# Patient Record
Sex: FEMALE | Race: OTHER | URBAN - METROPOLITAN AREA
[De-identification: names, ages, dates, MRNs, and addresses within clinical notes are randomized per-mention and may not be internally consistent; named-entity substitution may affect disease eponyms.]

---

## 2019-06-01 ENCOUNTER — INPATIENT (INPATIENT)
Facility: HOSPITAL | Age: 54
LOS: 2 days | Discharge: PSYCHIATRIC OR SUBSTANCE USE FACILITY | End: 2019-06-04
Attending: INTERNAL MEDICINE | Admitting: INTERNAL MEDICINE
Payer: COMMERCIAL

## 2019-06-01 VITALS
OXYGEN SATURATION: 99 % | TEMPERATURE: 98 F | SYSTOLIC BLOOD PRESSURE: 131 MMHG | HEART RATE: 82 BPM | RESPIRATION RATE: 18 BRPM | DIASTOLIC BLOOD PRESSURE: 84 MMHG | WEIGHT: 173.94 LBS | HEIGHT: 67 IN

## 2019-06-01 DIAGNOSIS — F32.9 MAJOR DEPRESSIVE DISORDER, SINGLE EPISODE, UNSPECIFIED: ICD-10-CM

## 2019-06-01 DIAGNOSIS — T42.4X2A POISONING BY BENZODIAZEPINES, INTENTIONAL SELF-HARM, INITIAL ENCOUNTER: ICD-10-CM

## 2019-06-01 LAB
ALBUMIN SERPL ELPH-MCNC: 3.7 G/DL — SIGNIFICANT CHANGE UP (ref 3.3–5)
ALP SERPL-CCNC: 89 U/L — SIGNIFICANT CHANGE UP (ref 40–120)
ALT FLD-CCNC: 20 U/L — SIGNIFICANT CHANGE UP (ref 12–78)
ANION GAP SERPL CALC-SCNC: 6 MMOL/L — SIGNIFICANT CHANGE UP (ref 5–17)
APAP SERPL-MCNC: < 2 UG/ML (ref 10–30)
AST SERPL-CCNC: 16 U/L — SIGNIFICANT CHANGE UP (ref 15–37)
BASOPHILS # BLD AUTO: 0.02 K/UL — SIGNIFICANT CHANGE UP (ref 0–0.2)
BASOPHILS NFR BLD AUTO: 0.3 % — SIGNIFICANT CHANGE UP (ref 0–2)
BILIRUB SERPL-MCNC: 0.4 MG/DL — SIGNIFICANT CHANGE UP (ref 0.2–1.2)
BUN SERPL-MCNC: 13 MG/DL — SIGNIFICANT CHANGE UP (ref 7–23)
CALCIUM SERPL-MCNC: 8.9 MG/DL — SIGNIFICANT CHANGE UP (ref 8.5–10.1)
CHLORIDE SERPL-SCNC: 110 MMOL/L — HIGH (ref 96–108)
CO2 SERPL-SCNC: 28 MMOL/L — SIGNIFICANT CHANGE UP (ref 22–31)
CREAT SERPL-MCNC: 0.83 MG/DL — SIGNIFICANT CHANGE UP (ref 0.5–1.3)
EOSINOPHIL # BLD AUTO: 0.03 K/UL — SIGNIFICANT CHANGE UP (ref 0–0.5)
EOSINOPHIL NFR BLD AUTO: 0.4 % — SIGNIFICANT CHANGE UP (ref 0–6)
ETHANOL SERPL-MCNC: <10 MG/DL — SIGNIFICANT CHANGE UP (ref 0–10)
GLUCOSE SERPL-MCNC: 88 MG/DL — SIGNIFICANT CHANGE UP (ref 70–99)
HCT VFR BLD CALC: 38 % — SIGNIFICANT CHANGE UP (ref 34.5–45)
HGB BLD-MCNC: 12.8 G/DL — SIGNIFICANT CHANGE UP (ref 11.5–15.5)
IMM GRANULOCYTES NFR BLD AUTO: 0.1 % — SIGNIFICANT CHANGE UP (ref 0–1.5)
LYMPHOCYTES # BLD AUTO: 2.61 K/UL — SIGNIFICANT CHANGE UP (ref 1–3.3)
LYMPHOCYTES # BLD AUTO: 35.2 % — SIGNIFICANT CHANGE UP (ref 13–44)
MCHC RBC-ENTMCNC: 29.4 PG — SIGNIFICANT CHANGE UP (ref 27–34)
MCHC RBC-ENTMCNC: 33.7 GM/DL — SIGNIFICANT CHANGE UP (ref 32–36)
MCV RBC AUTO: 87.4 FL — SIGNIFICANT CHANGE UP (ref 80–100)
MONOCYTES # BLD AUTO: 0.46 K/UL — SIGNIFICANT CHANGE UP (ref 0–0.9)
MONOCYTES NFR BLD AUTO: 6.2 % — SIGNIFICANT CHANGE UP (ref 2–14)
NEUTROPHILS # BLD AUTO: 4.29 K/UL — SIGNIFICANT CHANGE UP (ref 1.8–7.4)
NEUTROPHILS NFR BLD AUTO: 57.8 % — SIGNIFICANT CHANGE UP (ref 43–77)
NRBC # BLD: 0 /100 WBCS — SIGNIFICANT CHANGE UP (ref 0–0)
PLATELET # BLD AUTO: 222 K/UL — SIGNIFICANT CHANGE UP (ref 150–400)
POTASSIUM SERPL-MCNC: 3.8 MMOL/L — SIGNIFICANT CHANGE UP (ref 3.5–5.3)
POTASSIUM SERPL-SCNC: 3.8 MMOL/L — SIGNIFICANT CHANGE UP (ref 3.5–5.3)
PROT SERPL-MCNC: 8.1 GM/DL — SIGNIFICANT CHANGE UP (ref 6–8.3)
RBC # BLD: 4.35 M/UL — SIGNIFICANT CHANGE UP (ref 3.8–5.2)
RBC # FLD: 12.5 % — SIGNIFICANT CHANGE UP (ref 10.3–14.5)
SALICYLATES SERPL-MCNC: <1.7 MG/DL — LOW (ref 2.8–20)
SODIUM SERPL-SCNC: 144 MMOL/L — SIGNIFICANT CHANGE UP (ref 135–145)
TSH SERPL-MCNC: 2.73 UU/ML — SIGNIFICANT CHANGE UP (ref 0.36–3.74)
WBC # BLD: 7.42 K/UL — SIGNIFICANT CHANGE UP (ref 3.8–10.5)
WBC # FLD AUTO: 7.42 K/UL — SIGNIFICANT CHANGE UP (ref 3.8–10.5)

## 2019-06-01 PROCEDURE — 71045 X-RAY EXAM CHEST 1 VIEW: CPT | Mod: 26

## 2019-06-01 PROCEDURE — 93010 ELECTROCARDIOGRAM REPORT: CPT

## 2019-06-01 PROCEDURE — 99291 CRITICAL CARE FIRST HOUR: CPT

## 2019-06-01 RX ORDER — SODIUM CHLORIDE 9 MG/ML
1000 INJECTION, SOLUTION INTRAVENOUS
Refills: 0 | Status: DISCONTINUED | OUTPATIENT
Start: 2019-06-01 | End: 2019-06-04

## 2019-06-01 RX ORDER — PANTOPRAZOLE SODIUM 20 MG/1
40 TABLET, DELAYED RELEASE ORAL DAILY
Refills: 0 | Status: DISCONTINUED | OUTPATIENT
Start: 2019-06-01 | End: 2019-06-03

## 2019-06-01 RX ORDER — ENOXAPARIN SODIUM 100 MG/ML
40 INJECTION SUBCUTANEOUS DAILY
Refills: 0 | Status: DISCONTINUED | OUTPATIENT
Start: 2019-06-01 | End: 2019-06-04

## 2019-06-01 RX ADMIN — PANTOPRAZOLE SODIUM 40 MILLIGRAM(S): 20 TABLET, DELAYED RELEASE ORAL at 16:13

## 2019-06-01 RX ADMIN — SODIUM CHLORIDE 100 MILLILITER(S): 9 INJECTION, SOLUTION INTRAVENOUS at 16:48

## 2019-06-01 RX ADMIN — ENOXAPARIN SODIUM 40 MILLIGRAM(S): 100 INJECTION SUBCUTANEOUS at 16:13

## 2019-06-01 NOTE — ED ADULT NURSE NOTE - NS ED BHA MSE SPEECH VOLUME
How Severe Is Your Skin Lesion?: mild Has Your Skin Lesion Been Treated?: not been treated Is This A New Presentation, Or A Follow-Up?: Skin Lesion Soft

## 2019-06-01 NOTE — ED ADULT NURSE NOTE - NSIMPLEMENTINTERV_GEN_ALL_ED
Implemented All Fall with Harm Risk Interventions:  [place] to call system. Call bell, personal items and telephone within reach. Instruct patient to call for assistance. Room bathroom lighting operational. Non-slip footwear when patient is off stretcher. Physically safe environment: no spills, clutter or unnecessary equipment. Stretcher in lowest position, wheels locked, appropriate side rails in place. Provide visual cue, wrist band, yellow gown, etc. Monitor gait and stability. Monitor for mental status changes and reorient to person, place, and time. Review medications for side effects contributing to fall risk. Reinforce activity limits and safety measures with patient and family. Provide visual clues: red socks.

## 2019-06-01 NOTE — ED ADULT NURSE NOTE - HPI (INCLUDE ILLNESS QUALITY, SEVERITY, DURATION, TIMING, CONTEXT, MODIFYING FACTORS, ASSOCIATED SIGNS AND SYMPTOMS)
Depression x several years with SI with no plan until today, patient ingested an unknown amount of ativan tablets, states  saddens her and the recent passing of her mother in law exacerbated her depression inclusive of her  proceeding with a vacation to [place] [employer] despite the recent death of his mother. as per friends at the bedside state when she is happy with her  no depressive behaviors are noted however when they are fighting she becomes incredibly sad and hopeless.

## 2019-06-01 NOTE — ED PROVIDER NOTE - CLINICAL SUMMARY MEDICAL DECISION MAKING FREE TEXT BOX
pt with large dose of benzo overdose, otherwise stable but somnolent - will need continuous monitoring until awake enough for medical clearance and psych eval - as one half life is about 12 hours, may need 2 half live's before pt is awake enough for eval.

## 2019-06-01 NOTE — ED ADULT TRIAGE NOTE - CHIEF COMPLAINT QUOTE
pt took 15 tablets of lorazepam 2mg . history of depression. states she has been going through a lot with her .

## 2019-06-01 NOTE — ED PROVIDER NOTE - CRITICAL CARE INDICATION, MLM
patient was critically ill... Patient was critically ill with a high probability of imminent or life threatening deterioration. pt with suicide attempt - monitored closely in ED - until medical clearance, will need psych eval for possible psych admission after medical admission/clearance

## 2019-06-01 NOTE — H&P ADULT - NSHPPHYSICALEXAM_GEN_ALL_CORE
PHYSICAL EXAM:  GENERAL: NAD, well-groomed, well-developed  HEAD:  Atraumatic, Normocephalic  EYES: EOMI, PERRLA, conjunctiva and sclera clear  ENMT: No tonsillar erythema, exudates, or enlargement; Moist mucous membranes, No lesions  NECK: Supple, No JVD, Normal thyroid  NERVOUS SYSTEM:  Lethargic but arousable; Motor Strength 5/5 B/L upper and lower extremities; DTRs 2+ intact and symmetric  CHEST/LUNG: Clear bilaterally; No rales, rhonchi, wheezing, or rubs  HEART: Regular rate and rhythm; No murmurs, rubs, or gallops  ABDOMEN: Soft, Nontender, Nondistended; Bowel sounds present  EXTREMITIES:  2+ Peripheral Pulses, No clubbing, cyanosis, or edema  LYMPH: No lymphadenopathy noted  SKIN: No rashes or lesions

## 2019-06-01 NOTE — H&P ADULT - NSHPLABSRESULTS_GEN_ALL_CORE
LABS:                        12.8   7.42  )-----------( 222      ( 01 Jun 2019 12:08 )             38.0     06-01    144  |  110<H>  |  13  ----------------------------<  88  3.8   |  28  |  0.83    Ca    8.9      01 Jun 2019 12:08    TPro  8.1  /  Alb  3.7  /  TBili  0.4  /  DBili  x   /  AST  16  /  ALT  20  /  AlkPhos  89  06-01        CAPILLARY BLOOD GLUCOSE    Alcohol, Blood (06.01.19 @ 12:08)    Alcohol, Blood: <10: TOXIC CONCENTRATIONS (mg/dL):  Acetaminophen Level, Serum: < 2 ug/mL (06.01.19 @ 12:08)  Salicylate Level, Serum: <1.7 mg/dL (06.01.19 @ 12:08)

## 2019-06-01 NOTE — ED ADULT NURSE NOTE - OBJECTIVE STATEMENT
pt BIBA with c/o suicide attempt as per friend states that patient verbalized she was sad and depressed, as per patient has had marital problems and mother in law just past way, as per patient her  decided to proceed with a vacation despite the mothers passing which upset her very much, she states she has had ongoing marital problems x 4 years but today decided she couldn't take it anymore and wanted to " sleep" she admits to taking several ativan tablets, did not verbalize the exact amount upon arrival to the home the friend found the patient sleepy and decided to have her come to the ED, denies HI admits to previous thoughts of ending her life with no plan until today, patient is placed on 1:1 ligature risks addressed and on continuous cardiac monitoring.

## 2019-06-01 NOTE — ED PROVIDER NOTE - OBJECTIVE STATEMENT
54 year old female with PMH of hypothyroid, depression, otherwise presenting to hospital due to ativan overdose, took 15 tabs of 2mg  ativan at home - otherwise pt states took it as she was upset with  for leaving her to go on biking trip [facility] - states his mother just passed away but states  insisted on leaving though she states she is unable to handle other issues related to mother in law's passing.

## 2019-06-01 NOTE — H&P ADULT - HISTORY OF PRESENT ILLNESS
55yo, femaole with PMH of hypothyroid, depression, otherwise presenting to hospital due to ativan overdose, took 15 tabs of 2mg  ativan at home - otherwise pt  was upset with  for leaving her to go on biking trip [facility] - states his mother just passed away but states  insisted on leaving though she states she is unable to handle other issues related to mother in law's passing.

## 2019-06-01 NOTE — ED PROVIDER NOTE - CONSTITUTIONAL, MLM
normal... Well appearing, well nourished, awake, alert, oriented to person, place, time/situation and appears emotionally upset, currently not somnolent

## 2019-06-02 LAB
ALBUMIN SERPL ELPH-MCNC: 3.7 G/DL — SIGNIFICANT CHANGE UP (ref 3.3–5)
ALP SERPL-CCNC: 88 U/L — SIGNIFICANT CHANGE UP (ref 40–120)
ALT FLD-CCNC: 25 U/L — SIGNIFICANT CHANGE UP (ref 12–78)
AMPHET UR-MCNC: NEGATIVE — SIGNIFICANT CHANGE UP
ANION GAP SERPL CALC-SCNC: 7 MMOL/L — SIGNIFICANT CHANGE UP (ref 5–17)
APPEARANCE UR: CLEAR — SIGNIFICANT CHANGE UP
AST SERPL-CCNC: 15 U/L — SIGNIFICANT CHANGE UP (ref 15–37)
BACTERIA # UR AUTO: NEGATIVE — SIGNIFICANT CHANGE UP
BARBITURATES UR SCN-MCNC: NEGATIVE — SIGNIFICANT CHANGE UP
BASOPHILS # BLD AUTO: 0.03 K/UL — SIGNIFICANT CHANGE UP (ref 0–0.2)
BASOPHILS NFR BLD AUTO: 0.4 % — SIGNIFICANT CHANGE UP (ref 0–2)
BENZODIAZ UR-MCNC: NEGATIVE — SIGNIFICANT CHANGE UP
BILIRUB SERPL-MCNC: 0.4 MG/DL — SIGNIFICANT CHANGE UP (ref 0.2–1.2)
BILIRUB UR-MCNC: NEGATIVE — SIGNIFICANT CHANGE UP
BUN SERPL-MCNC: 12 MG/DL — SIGNIFICANT CHANGE UP (ref 7–23)
CALCIUM SERPL-MCNC: 9.1 MG/DL — SIGNIFICANT CHANGE UP (ref 8.5–10.1)
CHLORIDE SERPL-SCNC: 105 MMOL/L — SIGNIFICANT CHANGE UP (ref 96–108)
CO2 SERPL-SCNC: 29 MMOL/L — SIGNIFICANT CHANGE UP (ref 22–31)
COCAINE METAB.OTHER UR-MCNC: NEGATIVE — SIGNIFICANT CHANGE UP
COLOR SPEC: YELLOW — SIGNIFICANT CHANGE UP
CREAT SERPL-MCNC: 0.84 MG/DL — SIGNIFICANT CHANGE UP (ref 0.5–1.3)
DIFF PNL FLD: ABNORMAL
EOSINOPHIL # BLD AUTO: 0.08 K/UL — SIGNIFICANT CHANGE UP (ref 0–0.5)
EOSINOPHIL NFR BLD AUTO: 1 % — SIGNIFICANT CHANGE UP (ref 0–6)
EPI CELLS # UR: SIGNIFICANT CHANGE UP
GLUCOSE SERPL-MCNC: 97 MG/DL — SIGNIFICANT CHANGE UP (ref 70–99)
GLUCOSE UR QL: NEGATIVE MG/DL — SIGNIFICANT CHANGE UP
HCT VFR BLD CALC: 41.4 % — SIGNIFICANT CHANGE UP (ref 34.5–45)
HGB BLD-MCNC: 13.5 G/DL — SIGNIFICANT CHANGE UP (ref 11.5–15.5)
IMM GRANULOCYTES NFR BLD AUTO: 0.1 % — SIGNIFICANT CHANGE UP (ref 0–1.5)
KETONES UR-MCNC: NEGATIVE — SIGNIFICANT CHANGE UP
LEUKOCYTE ESTERASE UR-ACNC: NEGATIVE — SIGNIFICANT CHANGE UP
LYMPHOCYTES # BLD AUTO: 3.68 K/UL — HIGH (ref 1–3.3)
LYMPHOCYTES # BLD AUTO: 45.8 % — HIGH (ref 13–44)
MCHC RBC-ENTMCNC: 28.9 PG — SIGNIFICANT CHANGE UP (ref 27–34)
MCHC RBC-ENTMCNC: 32.6 GM/DL — SIGNIFICANT CHANGE UP (ref 32–36)
MCV RBC AUTO: 88.7 FL — SIGNIFICANT CHANGE UP (ref 80–100)
METHADONE UR-MCNC: NEGATIVE — SIGNIFICANT CHANGE UP
MONOCYTES # BLD AUTO: 0.45 K/UL — SIGNIFICANT CHANGE UP (ref 0–0.9)
MONOCYTES NFR BLD AUTO: 5.6 % — SIGNIFICANT CHANGE UP (ref 2–14)
NEUTROPHILS # BLD AUTO: 3.78 K/UL — SIGNIFICANT CHANGE UP (ref 1.8–7.4)
NEUTROPHILS NFR BLD AUTO: 47.1 % — SIGNIFICANT CHANGE UP (ref 43–77)
NITRITE UR-MCNC: NEGATIVE — SIGNIFICANT CHANGE UP
NRBC # BLD: 0 /100 WBCS — SIGNIFICANT CHANGE UP (ref 0–0)
OPIATES UR-MCNC: NEGATIVE — SIGNIFICANT CHANGE UP
PCP SPEC-MCNC: SIGNIFICANT CHANGE UP
PCP UR-MCNC: NEGATIVE — SIGNIFICANT CHANGE UP
PH UR: 6 — SIGNIFICANT CHANGE UP (ref 5–8)
PLATELET # BLD AUTO: 263 K/UL — SIGNIFICANT CHANGE UP (ref 150–400)
POTASSIUM SERPL-MCNC: 4.1 MMOL/L — SIGNIFICANT CHANGE UP (ref 3.5–5.3)
POTASSIUM SERPL-SCNC: 4.1 MMOL/L — SIGNIFICANT CHANGE UP (ref 3.5–5.3)
PROT SERPL-MCNC: 8.5 GM/DL — HIGH (ref 6–8.3)
PROT UR-MCNC: NEGATIVE MG/DL — SIGNIFICANT CHANGE UP
RBC # BLD: 4.67 M/UL — SIGNIFICANT CHANGE UP (ref 3.8–5.2)
RBC # FLD: 12.7 % — SIGNIFICANT CHANGE UP (ref 10.3–14.5)
RBC CASTS # UR COMP ASSIST: ABNORMAL /HPF (ref 0–4)
SODIUM SERPL-SCNC: 141 MMOL/L — SIGNIFICANT CHANGE UP (ref 135–145)
SP GR SPEC: 1.01 — SIGNIFICANT CHANGE UP (ref 1.01–1.02)
THC UR QL: NEGATIVE — SIGNIFICANT CHANGE UP
UROBILINOGEN FLD QL: NEGATIVE MG/DL — SIGNIFICANT CHANGE UP
WBC # BLD: 8.03 K/UL — SIGNIFICANT CHANGE UP (ref 3.8–10.5)
WBC # FLD AUTO: 8.03 K/UL — SIGNIFICANT CHANGE UP (ref 3.8–10.5)
WBC UR QL: SIGNIFICANT CHANGE UP

## 2019-06-02 RX ORDER — LEVOTHYROXINE SODIUM 125 MCG
75 TABLET ORAL DAILY
Refills: 0 | Status: DISCONTINUED | OUTPATIENT
Start: 2019-06-02 | End: 2019-06-04

## 2019-06-02 RX ADMIN — PANTOPRAZOLE SODIUM 40 MILLIGRAM(S): 20 TABLET, DELAYED RELEASE ORAL at 11:13

## 2019-06-02 RX ADMIN — ENOXAPARIN SODIUM 40 MILLIGRAM(S): 100 INJECTION SUBCUTANEOUS at 11:12

## 2019-06-02 RX ADMIN — Medication 75 MICROGRAM(S): at 13:52

## 2019-06-02 RX ADMIN — SODIUM CHLORIDE 100 MILLILITER(S): 9 INJECTION, SOLUTION INTRAVENOUS at 12:42

## 2019-06-02 NOTE — PROGRESS NOTE ADULT - SUBJECTIVE AND OBJECTIVE BOX
Patient is a 54y old  Female who presents with a chief complaint of Suicide attempt (2019 17:53)      SUBJECTIVE/OBJECTIVE:    Awake, alert, without complaints    MEDICATIONS  (STANDING):  dextrose 5% + sodium chloride 0.45%. 1000 milliLiter(s) (100 mL/Hr) IV Continuous <Continuous>  enoxaparin Injectable 40 milliGRAM(s) SubCutaneous daily  levothyroxine 75 MICROGram(s) Oral daily  pantoprazole  Injectable 40 milliGRAM(s) IV Push daily    MEDICATIONS  (PRN):      Allergies    No Known Allergies    Intolerances          Vital Signs Last 24 Hrs  T(C): 36.2 (2019 11:04), Max: 36.7 (2019 00:12)  T(F): 97.1 (2019 11:04), Max: 98 (2019 00:12)  HR: 66 (2019 11:04) (62 - 66)  BP: 129/79 (2019 11:04) (119/70 - 133/77)  BP(mean): --  RR: 16 (2019 11:04) (15 - 18)  SpO2: 99% (2019 11:04) (97% - 100%)    PHYSICAL EXAM:  GENERAL: NAD, well-groomed, well-developed  HEAD:  Atraumatic, Normocephalic  EYES: EOMI, PERRLA, conjunctiva and sclera clear  ENMT: No tonsillar erythema, exudates, or enlargement; Moist mucous membranes, No lesions  NECK: Supple, No JVD, Normal thyroid  NERVOUS SYSTEM:  Alert & Oriented X3; Motor Strength 5/5 B/L upper and lower extremities; DTRs 2+ intact and symmetric  CHEST/LUNG: Clear bilaterally; No rales, rhonchi, wheezing, or rubs  HEART: Regular rate and rhythm; No murmurs, rubs, or gallops  ABDOMEN: Soft, Nontender, Nondistended; Bowel sounds present  EXTREMITIES:  2+ Peripheral Pulses, No clubbing, cyanosis, or edema  LYMPH: No lymphadenopathy noted  SKIN: No rashes or lesions    LABS:                        13.5   8.03  )-----------( 263      ( 2019 07:02 )             41.4     06-02    141  |  105  |  12  ----------------------------<  97  4.1   |  29  |  0.84    Ca    9.1      2019 07:02    TPro  8.5<H>  /  Alb  3.7  /  TBili  0.4  /  DBili  x   /  AST  15  /  ALT  25  /  AlkPhos  88  06-02      Urinalysis Basic - ( 2019 06:56 )    Color: Yellow / Appearance: Clear / S.015 / pH: x  Gluc: x / Ketone: Negative  / Bili: Negative / Urobili: Negative mg/dL   Blood: x / Protein: Negative mg/dL / Nitrite: Negative   Leuk Esterase: Negative / RBC: 3-5 /HPF / WBC 3-5   Sq Epi: x / Non Sq Epi: Few / Bacteria: Negative      CAPILLARY BLOOD GLUCOSE              RADIOLOGY & ADDITIONAL TESTS:        Consultant(s) Notes Reviewed:  [ ] YES  [ ] NO

## 2019-06-03 ENCOUNTER — TRANSCRIPTION ENCOUNTER (OUTPATIENT)
Age: 54
End: 2019-06-03

## 2019-06-03 DIAGNOSIS — G47.00 INSOMNIA, UNSPECIFIED: ICD-10-CM

## 2019-06-03 DIAGNOSIS — F43.25 ADJUSTMENT DISORDER WITH MIXED DISTURBANCE OF EMOTIONS AND CONDUCT: ICD-10-CM

## 2019-06-03 PROCEDURE — 90792 PSYCH DIAG EVAL W/MED SRVCS: CPT

## 2019-06-03 RX ORDER — DIPHENHYDRAMINE HCL 50 MG
50 CAPSULE ORAL EVERY 6 HOURS
Refills: 0 | Status: DISCONTINUED | OUTPATIENT
Start: 2019-06-03 | End: 2019-06-04

## 2019-06-03 RX ORDER — HYDROXYZINE HCL 10 MG
25 TABLET ORAL EVERY 6 HOURS
Refills: 0 | Status: DISCONTINUED | OUTPATIENT
Start: 2019-06-03 | End: 2019-06-04

## 2019-06-03 RX ORDER — LEVOTHYROXINE SODIUM 125 MCG
1 TABLET ORAL
Qty: 0 | Refills: 0 | DISCHARGE
Start: 2019-06-03

## 2019-06-03 RX ORDER — LEVOTHYROXINE SODIUM 125 MCG
1 TABLET ORAL
Qty: 0 | Refills: 0 | DISCHARGE

## 2019-06-03 RX ORDER — PANTOPRAZOLE SODIUM 20 MG/1
40 TABLET, DELAYED RELEASE ORAL
Refills: 0 | Status: DISCONTINUED | OUTPATIENT
Start: 2019-06-03 | End: 2019-06-04

## 2019-06-03 RX ADMIN — ENOXAPARIN SODIUM 40 MILLIGRAM(S): 100 INJECTION SUBCUTANEOUS at 12:45

## 2019-06-03 RX ADMIN — Medication 75 MICROGRAM(S): at 05:41

## 2019-06-03 RX ADMIN — PANTOPRAZOLE SODIUM 40 MILLIGRAM(S): 20 TABLET, DELAYED RELEASE ORAL at 12:45

## 2019-06-03 RX ADMIN — SODIUM CHLORIDE 100 MILLILITER(S): 9 INJECTION, SOLUTION INTRAVENOUS at 05:41

## 2019-06-03 RX ADMIN — Medication 2 MILLIGRAM(S): at 22:23
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
## 2019-06-03 NOTE — DISCHARGE NOTE PROVIDER - HOSPITAL COURSE
53yo, femaole with PMH of hypothyroid, depression, otherwise presenting to hospital due to ativan overdose, took 15 tabs of 2mg  ativan at home - otherwise pt  was upset with  for leaving her to go on biking trip [facility] - states his mother just passed away but states  insisted on leaving though she states she is unable to handle other issues related to mother in law's passing.    Admitted for Ativan OD 2/2 Suicide Attempt, treated with monitoring, IVF, supportive care. Once medically stable, seen by Psych and determined patient     would benefit from inpatient psychiatric treatment.  The pateint was subsequentialy transfer to Psych.

## 2019-06-03 NOTE — CHART NOTE - NSCHARTNOTEFT_GEN_A_CORE
COLLATERAL FROM DAUGHTER OSMIN phone [phone] (called back after message left). Osmin confirmed that her parents "have not been getting along for a while" and her father has expressed desire to separate and live his own life which Patient has had a very difficult time accepting. Daughter states that patient has been dramatic on several occasions including "saying something is wrong but then she is fine." So they felt like it was another one of these episodes and she was attention seeking. They were told by others at the [facility] together that they saw her take the pills and then someone said they also saw her sitting in a car taking pills. They were thus surprised as this was the first time Patient did something this serious. Daughter supportive of psych admission and will bring her clothing and toiletries.

## 2019-06-03 NOTE — BEHAVIORAL HEALTH ASSESSMENT NOTE - SUMMARY
53 yo female with suspected Axis II Cluster B traits (Histrionic), chronic insomnia, with 1.5 yr hx of outpatient weekly individual therapy with great difficulty coping with marital and family conflicts, culminating in a highly impulsive action of intentionally ingesting multiple Ativan pills in front of others with ambivalence about living at the moment. Patient is a threat to self and needs inpatient level of care for stabilization. 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 Patient is a threat to self and needs inpatient level of care for stabilization.  PLAN:  - transfer to inpatient psychiatry; patient is informed (admitted that she needs help and time away to herself)  - given > 2 years daily Ativan use, continue Ativan 2mg PO qhs and Ativan 1mg PO q6hrs prn for potential benzo withdrawal sxs (may be under reporting use). Recommendation to switch Ativan overt to another agent used for insomnia / sleep  - continue Synthroid 75mcg PO qd for hypothyroidism   - regular diet, regular exercise/ambulate independently   - does not need a CO 1;1 on the inpatient psych unit 53 yo female with suspected Axis II Cluster B traits (Histrionic), chronic insomnia, with 1.5 yr hx of outpatient weekly individual therapy with great difficulty coping with marital and family conflicts, culminating in a highly impulsive action of intentionally ingesting multiple Ativan pills in front of others with ambivalence about living at the moment. Patient is a threat to self and needs inpatient level of care for stabilization.  PLAN:  - transfer to inpatient psychiatry; patient is informed (admitted that she needs help and time away to herself)  - given > 2 years daily Ativan use, continue Ativan 2mg PO qhs and Ativan 1mg PO q6hrs prn for potential benzo withdrawal sxs (may be under reporting use). Recommendation to switch Ativan overt to another agent used for insomnia / sleep  - Substances: social drinker; no evidence of alcohol abuse/dependence; low clinical indication for CIWA  - continue Synthroid 75mcg PO qd for hypothyroidism   - regular diet, regular exercise/ambulate independently   - does not need a CO 1;1 on the inpatient psych unit  - continue CO 1:1 while on the medical unit awaiting transfer

## 2019-06-03 NOTE — BEHAVIORAL HEALTH ASSESSMENT NOTE - NSBHMEDSOTHERFT_PSY_A_CORE
no prescription medications; she has used Ativan 2mg PO qhs prn/ qd prn from a friend who sends it from [facility]. At times takes it daily standing BID.

## 2019-06-03 NOTE — DISCHARGE NOTE PROVIDER - NSDCCPCAREPLAN_GEN_ALL_CORE_FT
PRINCIPAL DISCHARGE DIAGNOSIS  Diagnosis: Benzodiazepine overdose  Assessment and Plan of Treatment: Resolved  Inpatient Psych      SECONDARY DISCHARGE DIAGNOSES  Diagnosis: Intentional benzodiazepine overdose, initial encounter  Assessment and Plan of Treatment: As above    Diagnosis: Depression, unspecified depression type  Assessment and Plan of Treatment: As above

## 2019-06-03 NOTE — BEHAVIORAL HEALTH ASSESSMENT NOTE - NSBHADMITIPDSM4_PSY_A_CORE FT
ongoing marital conflict; financial stressors. ongoing marital conflict; financial stressors - does not want to divorce and having to sell house as she cannot afford anything on her own; wants to work out marriage and "make him care"

## 2019-06-03 NOTE — BEHAVIORAL HEALTH ASSESSMENT NOTE - NSBHADMITIPSTRENGTH_PSY_A_CORE
Involved in cultural/spiritual/Anabaptist/community activities/Has access to housing/residential stability/In good physical health

## 2019-06-03 NOTE — DISCHARGE NOTE PROVIDER - CARE PROVIDER_API CALL
Maureen Brito)  [place] [address]  Phone: [phone]  Fax: [phone]  Follow Up Time: 2 weeks

## 2019-06-03 NOTE — BEHAVIORAL HEALTH ASSESSMENT NOTE - NSBHSUICPROTECTFACT_PSY_A_CORE
Positive therapeutic relationships/Supportive social network or family/High spirituality/Engaged in work or school

## 2019-06-03 NOTE — BEHAVIORAL HEALTH ASSESSMENT NOTE - OTHER
CVM, ISTOP mother in law's passing;  went for a trip tearful mother in law's passing;  went for a trip; ongoing marital issues baseline fair; limited at this time impaired at this time
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
## 2019-06-04 ENCOUNTER — INPATIENT (INPATIENT)
Facility: HOSPITAL | Age: 54
LOS: 5 days | Discharge: HOME | DRG: 882 | End: 2019-06-10
Attending: PSYCHIATRY & NEUROLOGY | Admitting: PSYCHIATRY & NEUROLOGY
Payer: COMMERCIAL

## 2019-06-04 VITALS
SYSTOLIC BLOOD PRESSURE: 100 MMHG | RESPIRATION RATE: 18 BRPM | TEMPERATURE: 97 F | DIASTOLIC BLOOD PRESSURE: 69 MMHG | OXYGEN SATURATION: 98 % | HEART RATE: 66 BPM

## 2019-06-04 VITALS
TEMPERATURE: 98 F | WEIGHT: 194.01 LBS | SYSTOLIC BLOOD PRESSURE: 139 MMHG | RESPIRATION RATE: 16 BRPM | HEART RATE: 75 BPM | DIASTOLIC BLOOD PRESSURE: 88 MMHG | HEIGHT: 66 IN | OXYGEN SATURATION: 99 %

## 2019-06-04 DIAGNOSIS — F33.2 MAJOR DEPRESSIVE DISORDER, RECURRENT SEVERE WITHOUT PSYCHOTIC FEATURES: ICD-10-CM

## 2019-06-04 PROBLEM — T50.902A POISONING BY UNSPECIFIED DRUGS, MEDICAMENTS AND BIOLOGICAL SUBSTANCES, INTENTIONAL SELF-HARM, INITIAL ENCOUNTER: Chronic | Status: ACTIVE | Noted: 2019-06-01

## 2019-06-04 PROBLEM — E03.9 HYPOTHYROIDISM, UNSPECIFIED: Chronic | Status: ACTIVE | Noted: 2019-06-01

## 2019-06-04 PROBLEM — F32.9 MAJOR DEPRESSIVE DISORDER, SINGLE EPISODE, UNSPECIFIED: Chronic | Status: ACTIVE | Noted: 2019-06-01

## 2019-06-04 RX ORDER — LEVOTHYROXINE SODIUM 125 MCG
1 TABLET ORAL
Qty: 30 | Refills: 0
Start: 2019-06-04 | End: 2019-07-03

## 2019-06-04 RX ORDER — ACETAMINOPHEN 500 MG
650 TABLET ORAL EVERY 6 HOURS
Refills: 0 | Status: DISCONTINUED | OUTPATIENT
Start: 2019-06-04 | End: 2019-06-10

## 2019-06-04 RX ORDER — MAGNESIUM HYDROXIDE 400 MG/1
30 TABLET, CHEWABLE ORAL DAILY
Refills: 0 | Status: DISCONTINUED | OUTPATIENT
Start: 2019-06-04 | End: 2019-06-10

## 2019-06-04 RX ORDER — ZOLPIDEM TARTRATE 10 MG/1
5 TABLET ORAL AT BEDTIME
Refills: 0 | Status: DISCONTINUED | OUTPATIENT
Start: 2019-06-04 | End: 2019-06-10

## 2019-06-04 RX ORDER — HYDROXYZINE HCL 10 MG
50 TABLET ORAL EVERY 6 HOURS
Refills: 0 | Status: DISCONTINUED | OUTPATIENT
Start: 2019-06-04 | End: 2019-06-10

## 2019-06-04 RX ORDER — LEVOTHYROXINE SODIUM 125 MCG
75 TABLET ORAL DAILY
Refills: 0 | Status: DISCONTINUED | OUTPATIENT
Start: 2019-06-04 | End: 2019-06-10

## 2019-06-04 RX ORDER — NICOTINE POLACRILEX 2 MG
1 GUM BUCCAL
Refills: 0 | Status: DISCONTINUED | OUTPATIENT
Start: 2019-06-04 | End: 2019-06-10

## 2019-06-04 RX ADMIN — PANTOPRAZOLE SODIUM 40 MILLIGRAM(S): 20 TABLET, DELAYED RELEASE ORAL at 05:34

## 2019-06-04 RX ADMIN — Medication 75 MICROGRAM(S): at 05:34

## 2019-06-04 RX ADMIN — SODIUM CHLORIDE 100 MILLILITER(S): 9 INJECTION, SOLUTION INTRAVENOUS at 06:31

## 2019-06-04 RX ADMIN — ENOXAPARIN SODIUM 40 MILLIGRAM(S): 100 INJECTION SUBCUTANEOUS at 11:45
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
## 2019-06-04 NOTE — CHART NOTE - NSCHARTNOTEFT_GEN_A_CORE
Writer provided hand off to Dr Valdez and TESHA Krishna at [facility] inpatient psychiatric unit. Transfer note with psychiatric history / assessment written in [place] pre-reg episode to ensure continuation of care.

## 2019-06-04 NOTE — PATIENT PROFILE BEHAVIORAL HEALTH - NSBHSXPREF_PSY_A_CORE
Person with Disability Certification for Parking Placard/License Plate has been completed. Patient has been notified paperwork is ready for pick-up.
heterosexual/straight

## 2019-06-05 LAB
CHOLEST SERPL-MCNC: 216 MG/DL — HIGH (ref 10–199)
HBA1C BLD-MCNC: 5.8 % — HIGH (ref 4–5.6)
HDLC SERPL-MCNC: 51 MG/DL — SIGNIFICANT CHANGE UP
LIPID PNL WITH DIRECT LDL SERPL: 143 MG/DL — HIGH
T PALLIDUM AB TITR SER: NEGATIVE — SIGNIFICANT CHANGE UP
TOTAL CHOLESTEROL/HDL RATIO MEASUREMENT: 4.2 RATIO — SIGNIFICANT CHANGE UP (ref 3.3–7.1)
TRIGL SERPL-MCNC: 108 MG/DL — SIGNIFICANT CHANGE UP (ref 10–149)

## 2019-06-05 PROCEDURE — 99233 SBSQ HOSP IP/OBS HIGH 50: CPT

## 2019-06-05 RX ORDER — TRAZODONE HCL 50 MG
50 TABLET ORAL AT BEDTIME
Refills: 0 | Status: DISCONTINUED | OUTPATIENT
Start: 2019-06-05 | End: 2019-06-06

## 2019-06-05 RX ADMIN — Medication 50 MILLIGRAM(S): at 21:52

## 2019-06-05 RX ADMIN — Medication 75 MICROGRAM(S): at 06:03

## 2019-06-05 NOTE — PROGRESS NOTE BEHAVIORAL HEALTH - NSBHFUPSUICPROTECT_PSY_A_CORE
Responsibility to family and others/Identifies reasons for living/Engaged in work or school/Positive therapeutic relationships/Future oriented/Supportive social network or family/Fear of death or dying due to pain/suffering/High spirituality
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
## 2019-06-05 NOTE — PROGRESS NOTE BEHAVIORAL HEALTH - NSBHFUPADDHPIFT_PSY_A_CORE
Patient reports "I overdosed a little"  She reports she was upset that her  was going on a motorcycle trip with friends to [place], and took some Ativan, in front of  and family.  Patient reports "It was stupid, and I would not do it again."
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
## 2019-06-05 NOTE — PROGRESS NOTE BEHAVIORAL HEALTH - NSBHADDHXPSYCHFT_PSY_A_CORE
has outpatient verbal tx   did not have therapist phone number but reports will get it so staff can call

## 2019-06-05 NOTE — OCCUPATIONAL THERAPY INITIAL EVALUATION ADULT - PLANNED THERAPY INTERVENTIONS, OT EVAL
Recommendations: Pt. would benefit from skilled Occupational Therapy intervention services with CBT to increase self-awareness, increase socialization, learn community resources, learn time management, learn medication management, learn anger management techniques, learn coping skills, increase self-esteem, increase physical activity, increase attention span, improve sleep, stop substance abuse, identify strengths, express feelings, acquire new hobbies, learn relaxation techniques, learn stress management, improve self-scare, and to learn ways to increase their support system.

## 2019-06-05 NOTE — PROGRESS NOTE BEHAVIORAL HEALTH - NSBHFUPSTRENGTHS_PSY_A_CORE
Has access to housing/residential stability/In good physical health/Has supportive interpersonal relationships with family, friends or peers
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
## 2019-06-05 NOTE — PROGRESS NOTE BEHAVIORAL HEALTH - NSBHFUPINTERVALHXFT_PSY_A_CORE
Met with and evaluated patient.  Chart reviewed and case discussed in tx team meeting.  No significant interval events are reported. Patient verbalizes regret about the overdose, and denies any SI today.  Reports her mood is not depressed, and declines any antidepressant Rx.  Does report poor sleep, and requests Rx for sleep. Patient reports she feels much more able to cope, and that she would not try to harm herself again.  CAMS assessment done with patient showing low risk for suicide.  Patient is able to ID reasons for living "My children, my mom, my siblings, my friends"  She reports she feels hopeful about her future, and is looking forward to going home and going back to work. She reports she is a Restorationism person.  She reports her  will hold her pills and she is ok with this.  She is able to ID appropriate coping skills such as: cooking, taking a walk and listening to music to calm herself down when she is upset.  She reports a good support system of friends she can call if she is upset.  Patient was given phone numbers for:  Crisis Center, Suicide Hotline and reports she would call those numbers if she felt suicidal.  Also given phone number and address for [facility] and reports she would call them or go there if she was upset or had SI.  Patient reports if she has SI, she would tell her friends or family, go to the nearest ED, or call the numbers given to her.  Patient reports she wants to live, and she loves life.

## 2019-06-06 PROCEDURE — 99233 SBSQ HOSP IP/OBS HIGH 50: CPT

## 2019-06-06 RX ORDER — MAGNESIUM HYDROXIDE 400 MG/1
30 TABLET, CHEWABLE ORAL
Qty: 0 | Refills: 0 | DISCHARGE
Start: 2019-06-06

## 2019-06-06 RX ORDER — ACETAMINOPHEN 500 MG
2 TABLET ORAL
Qty: 0 | Refills: 0 | DISCHARGE
Start: 2019-06-06

## 2019-06-06 RX ORDER — LEVOTHYROXINE SODIUM 125 MCG
1 TABLET ORAL
Qty: 0 | Refills: 0 | DISCHARGE
Start: 2019-06-06

## 2019-06-06 RX ADMIN — ZOLPIDEM TARTRATE 5 MILLIGRAM(S): 10 TABLET ORAL at 22:05

## 2019-06-06 RX ADMIN — Medication 75 MICROGRAM(S): at 06:36

## 2019-06-06 NOTE — DISCHARGE NOTE BEHAVIORAL HEALTH - NSBHDCCRISISPLAN3FT_PSY_A_CORE
Attend the Tuesday evening weekly depression group at [facility] from 6:30-8:30 PM in the doctor's lounge.

## 2019-06-06 NOTE — DISCHARGE NOTE BEHAVIORAL HEALTH - MEDICATION SUMMARY - MEDICATIONS TO TAKE
I will START or STAY ON the medications listed below when I get home from the hospital:    acetaminophen 325 mg oral tablet  -- 2 tab(s) by mouth every 6 hours, As needed, Temp greater or equal to 38C (100.4F), Moderate Pain (4 - 6)  -- Indication: For pain    magnesium hydroxide 8% oral suspension  -- 30 milliliter(s) by mouth once a day, As needed, Constipation  -- Indication: For constipation    levothyroxine 75 mcg (0.075 mg) oral tablet  -- 1 tab(s) by mouth once a day  -- Indication: For hypothyroid I will START or STAY ON the medications listed below when I get home from the hospital:    acetaminophen 325 mg oral tablet  -- 2 tab(s) by mouth every 6 hours, As needed, Temp greater or equal to 38C (100.4F), Moderate Pain (4 - 6)  -- Indication: For Pain    magnesium hydroxide 8% oral suspension  -- 30 milliliter(s) by mouth once a day, As needed, Constipation  -- Indication: For Constipation    melatonin 3 mg oral tablet  -- 1 tab(s) by mouth once a day (at bedtime) x 30 days-Insomnia   -- Indication: For Sleep    levothyroxine 75 mcg (0.075 mg) oral tablet  -- 1 tab(s) by mouth once a day  -- Indication: For Hypothyroid

## 2019-06-06 NOTE — DISCHARGE NOTE BEHAVIORAL HEALTH - NSBHDCSUICFCTRMIT_PSY_A_CORE
Patient has very supportive family and friends, and good therapeutic relationship  Patient feels she is a competent and capable person, and can reach out to others if needed for support

## 2019-06-06 NOTE — DISCHARGE NOTE BEHAVIORAL HEALTH - HPI (INCLUDE ILLNESS QUALITY, SEVERITY, DURATION, TIMING, CONTEXT, MODIFYING FACTORS, ASSOCIATED SIGNS AND SYMPTOMS)
Patient does not have any hx of inpatient psychiatric hospitalization.   Patient has outpatient verbal therapy at [facility], and reports it is helpful to her, and she plans to return.  Patient reports family conflicts and stressors led her to overdose impulsively. She denies any current suicidal thoughts, plan or intent.  She reports she is not depressed and declines antidepressant Rx, and none are indicated at this time.  She reports she was taking Ativan prn while at home, which was not prescribed, but sent to her by a  friend in [facility].

## 2019-06-06 NOTE — DISCHARGE NOTE BEHAVIORAL HEALTH - NSBHDCSUBSTHXFT_PSY_A_CORE
was taking Ativan sent to her by a friend, reports took daily at times, but not always  reports is a social drinker, about once a week She was taking Ativan sent to her by a friend, reports took daily at times, but not always  reports is a social drinker, about once a week

## 2019-06-06 NOTE — DISCHARGE NOTE BEHAVIORAL HEALTH - NSBHDCDXVALIDYESFT_PSY_A_CORE
Patient reported stressors re: marital conflict and recent death of her mother in law, and marked distress related to that event.  Patient impulsively overdosed on Ativan in context of family crisis

## 2019-06-06 NOTE — DISCHARGE NOTE BEHAVIORAL HEALTH - NSBHDCCRISISPLAN2FT_PSY_A_CORE
Express your feelings in a calm non-threatening manner. Make a time to sit down with  your  and talk about what is bothering you (that he undermines you).

## 2019-06-06 NOTE — DISCHARGE NOTE BEHAVIORAL HEALTH - NSBHDCSUICPROTECTFT_PSY_A_CORE
IDs reasons to live, responsibility to others, future oriented, supportive social network and family, engaged in work, positive therapeutic relationships   CAMS assessment shows low risk for suicide

## 2019-06-06 NOTE — DISCHARGE NOTE BEHAVIORAL HEALTH - NSBHDCCONDITIONFT_PSY_A_CORE
patient denies any SI.  Patient is stable and mood is euthymic Patient denies any SI.  Patient is stable and mood is euthymic

## 2019-06-06 NOTE — DISCHARGE NOTE BEHAVIORAL HEALTH - NSBHDCCRISISPLAN1FT_PSY_A_CORE
Used healthy coping skills learned at [facility]: phone a friend, take a nature walk, start your garden, listen to music, deep breathing, and/or take a relaxing bath.

## 2019-06-06 NOTE — DISCHARGE NOTE BEHAVIORAL HEALTH - PROVIDER TOKENS
FREE:[LAST:[[facility]],PHONE:[phone]],FAX:[(   [phone]    -],ADDRESS:[[address]; [address]  06/11/2019 @ 6:00 PM],FOLLOWUP:[1-3 days]]

## 2019-06-06 NOTE — PROGRESS NOTE BEHAVIORAL HEALTH - NSBHFUPIPCHARTREVFT_PSY_A_CORE
53 yo M [place] female with suspected Axis II Cluster B traits (Histrionic), chronic insomnia, with 1.5 yr hx of outpatient weekly individual therapy with great difficulty coping with marital and family conflicts, culminating in a highly impulsive action of intentionally ingesting multiple Ativan pills in front of others with ambivalence about living at the moment. Patient is a threat to self and needs inpatient level of care for stabilization.  Transferred to [facility] for inpatient psychiatric treatment and safety
55 yo M Botswan[place] female with suspected Axis II Cluster B traits (Histrionic), chronic insomnia, with 1.5 yr hx of outpatient weekly individual therapy with great difficulty coping with marital and family conflicts, culminating in a highly impulsive action of intentionally ingesting multiple Ativan pills in front of others with ambivalence about living at the moment. Patient is a threat to self and needs inpatient level of care for stabilization.  Transferred to [facility] for inpatient psychiatric treatment and safety

## 2019-06-06 NOTE — DISCHARGE NOTE BEHAVIORAL HEALTH - NSBHDCSUICFCTRSFT_PSY_A_CORE
family conflicts- patient reports she will address these issues in verbal therapy, and will stay with her friend of her sister

## 2019-06-06 NOTE — PROGRESS NOTE BEHAVIORAL HEALTH - NSBHADMITCOUNSEL_PSY_A_CORE
risks and benefits of treatment options/importance of adherence to chosen treatment/client/family/caregiver education/instructions for management, treatment and follow up
risks and benefits of treatment options/importance of adherence to chosen treatment

## 2019-06-06 NOTE — DISCHARGE NOTE BEHAVIORAL HEALTH - FAMILY HISTORY OF PSYCHIATRIC ILLNESS
reports marital conflict and conflict with her in-laws  patient's family is supportive She reports marital conflict and conflict with her in-laws  Patient's family is supportive

## 2019-06-06 NOTE — PROGRESS NOTE BEHAVIORAL HEALTH - NSBHFUPINTERVALHXFT_PSY_A_CORE
Met with and evaluated patient.  Chart reviewed and case discussed in tx team meeting.  No significant interval events are reported. Patient continues regret about the overdose, and denies any SI today.  Reports her mood is not depressed, and continues to decline any antidepressant Rx.  Does report poor sleep, and requests Rx for sleep reports Trazadone did not work too well.  Reports she will try the hydroxyzine already ordered for anxiety to see if it helps her. . Patient reports she feels much more able to cope, and that  her family is visiting and supportive.  Reports she has supply of her  thyroid Rx at home and does not need an Rx.  Patient reports if she has SI, she would tell her friends or family, go to the nearest ED, or call the numbers given to her. Denies any HI or psychotic or manic sx.  No Rx SE are noted or reported. Met with patient's sister Yanira ([phone]) and family members with patient's permission.  They report they are supportive of patient, and sister reports patient can stay with her after discharge.  They discussed their concerns about patient, and sister reports she will monitor patient after discharge and be sure she is safe. Met with and evaluated patient.  Chart reviewed and case discussed in tx team meeting.  No significant interval events are reported. Patient continues regret about the overdose, and denies any SI today.  Reports her mood is not depressed, and continues to decline any antidepressant Rx.  Does report poor sleep, and requests Rx for sleep reports Trazadone did not work too well.  Reports she will try the hydroxyzine already ordered for anxiety to see if it helps her. . Patient reports she feels much more able to cope, and that  her family is visiting and supportive.  Reports she has supply of her  thyroid Rx at home and does not need an Rx.  Patient reports if she has SI, she would tell her friends or family, go to the nearest ED, or call the numbers given to her. Denies any HI or psychotic or manic sx.  No Rx SE are noted or reported. Met with patient's sister Yanira ([phone]) and family members with patient's permission.  They report they are supportive of patient, and sister reports patient can stay with her after discharge.  They discussed their concerns about patient, and sister reports she will monitor patient after discharge and be sure she is safe.    2019: Patient was seen today AM with TESHA Anguiano. She has no prior SI/SA, denied drug abuse, and does housekeeping for past few years. Her  works in [employer].  She felt upset, and was not happy that her  is going fro hiking on bike the day after his mother . so she was upset and sad and thus OD on Ativan to draw attention from family. She has good support from her own family, her siblings are coming from far to see her and to take responsibility after discharge. She is denying any S/H/i/P at this time. Even though it was a OD attempt, patient was explained that for technical reasons we are unable to discharge her this week, hopefully discharge early next week. She has fair sleep/appetite. She is also vocal, no meds were prescribed for her except something to help with sleep. Medically has Hypo-Thyroidism and to continue with therapy once a week. At this point patient does not need anti-depressants for now.

## 2019-06-06 NOTE — DISCHARGE NOTE BEHAVIORAL HEALTH - NSBHDCMEDICALFT_PSY_A_CORE
Hypothyroid, is on synthroid  HgbA1C is 5.8  Patient strongly advised to follow up with outpatient medical provider and reports will do so

## 2019-06-06 NOTE — DISCHARGE NOTE BEHAVIORAL HEALTH - CARE PROVIDER_API CALL
[facility],   [address] [facility] ; [place]; [address]  06/11/2019 @ 6:00 PM  Phone: [phone]  Fax: (   )    -  Follow Up Time: 1-3 days
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
## 2019-06-06 NOTE — DISCHARGE NOTE BEHAVIORAL HEALTH - NSBHDCCRISISPLAN4FT_PSY_A_CORE
STOP SINOME! Call 1800SUICIDE, call 911, or go to nearest Emergency Room.    You can do this Sinome! Make sure you make a tuesday Depression Group meeting when you can! Best of lizandro Pandey, OT

## 2019-06-06 NOTE — DISCHARGE NOTE BEHAVIORAL HEALTH - NSBHDCSUICSAFETYFT_PSY_A_CORE
Patient reports if she has SI she will:  call her friend or her sister, call her therapist,  go to the nearest ED, and/or call the  Crisis Center, the Suicide Hotline (has those phone numbers), or call or go to the [facility] in [place] (has the address and phone number)

## 2019-06-07 PROCEDURE — 99231 SBSQ HOSP IP/OBS SF/LOW 25: CPT

## 2019-06-07 RX ADMIN — ZOLPIDEM TARTRATE 5 MILLIGRAM(S): 10 TABLET ORAL at 22:29

## 2019-06-07 RX ADMIN — Medication 75 MICROGRAM(S): at 06:05
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
## 2019-06-07 NOTE — PROGRESS NOTE BEHAVIORAL HEALTH - NSBHFUPINTERVALHXFT_PSY_A_CORE
Met with and evaluated patient.  Chart reviewed and case discussed in tx team meeting.  No significant interval events are reported. Patient is visible in unit, no PRN meds ordered, fair to good sleep/appetite. Overall doing  very well, not S/h and seems to regret her decision to OD on p[ills. Still tolerating her stance well with no meds ordered as standing. No perceptual experiences noted. Discussed with patient the option pf staying over the weekend and to be discharged early next week.
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
## 2019-06-07 NOTE — PROVIDER CONTACT NOTE (OTHER) - RECOMMENDATIONS
Will continue to work with pt. on importance of dialogue with spouse and how she should let her needs be known early on as opposed to bottling them up and feeling resentment later on.

## 2019-06-08 PROCEDURE — 99231 SBSQ HOSP IP/OBS SF/LOW 25: CPT

## 2019-06-08 RX ADMIN — Medication 50 MILLIGRAM(S): at 22:02

## 2019-06-08 RX ADMIN — ZOLPIDEM TARTRATE 5 MILLIGRAM(S): 10 TABLET ORAL at 22:02

## 2019-06-08 RX ADMIN — Medication 75 MICROGRAM(S): at 06:42

## 2019-06-08 NOTE — PROGRESS NOTE BEHAVIORAL HEALTH - NSBHFUPINTERVALHXFT_PSY_A_CORE
Met with and evaluated patient.  Chart reviewed and case discussed in tx team meeting.  Patient was in bed, smiling, and was upbeat as she wants to be discharged ASAP. She has no significant issues, good sleep/appetite. Not S/h at this time. she endorses repeatedly that she would never do things like this. Her family OK for her to go home on Monday. No meds were offered as she is not depressed,

## 2019-06-09 PROCEDURE — 99231 SBSQ HOSP IP/OBS SF/LOW 25: CPT

## 2019-06-09 RX ADMIN — Medication 50 MILLIGRAM(S): at 22:12

## 2019-06-09 RX ADMIN — Medication 75 MICROGRAM(S): at 06:11

## 2019-06-09 NOTE — PROGRESS NOTE BEHAVIORAL HEALTH - NSBHFUPINTERVALHXFT_PSY_A_CORE
Met with and evaluated patient.  Chart reviewed and case discussed in tx team meeting.  She smiles on approach. Patient went to group today, no acute issues, looks good looking forward to leave tomorrow. No PRN meds needed,. good sleep/appetite. To continue with no meds

## 2019-06-10 VITALS
HEART RATE: 76 BPM | OXYGEN SATURATION: 98 % | TEMPERATURE: 98 F | DIASTOLIC BLOOD PRESSURE: 73 MMHG | SYSTOLIC BLOOD PRESSURE: 118 MMHG | RESPIRATION RATE: 16 BRPM

## 2019-06-10 PROCEDURE — 86780 TREPONEMA PALLIDUM: CPT

## 2019-06-10 PROCEDURE — 83036 HEMOGLOBIN GLYCOSYLATED A1C: CPT

## 2019-06-10 PROCEDURE — 80061 LIPID PANEL: CPT

## 2019-06-10 PROCEDURE — 99239 HOSP IP/OBS DSCHRG MGMT >30: CPT

## 2019-06-10 PROCEDURE — 36415 COLL VENOUS BLD VENIPUNCTURE: CPT

## 2019-06-10 RX ORDER — LANOLIN ALCOHOL/MO/W.PET/CERES
3 CREAM (GRAM) TOPICAL AT BEDTIME
Refills: 0 | Status: DISCONTINUED | OUTPATIENT
Start: 2019-06-10 | End: 2019-06-10

## 2019-06-10 RX ORDER — LANOLIN ALCOHOL/MO/W.PET/CERES
1 CREAM (GRAM) TOPICAL
Qty: 30 | Refills: 0
Start: 2019-06-10 | End: 2019-07-09

## 2019-06-10 RX ADMIN — Medication 75 MICROGRAM(S): at 06:37

## 2019-06-10 NOTE — PROGRESS NOTE BEHAVIORAL HEALTH - NSBHPTASSESSDT_PSY_A_CORE
05-Jun-2019 14:41
07-Jun-2019 14:14
10-Giovanny-2019 11:37
08-Jun-2019 11:50
06-Jun-2019 12:54
09-Jun-2019 14:45

## 2019-06-10 NOTE — PROGRESS NOTE BEHAVIORAL HEALTH - NSBHFUPINTERVALHXFT_PSY_A_CORE
Met with and evaluated patient.  Chart reviewed and case discussed in tx team meeting.  She smiled on approach and first thing she asked " Am I leaving today, what time" . Patient went to group today, no acute issues, looks good looking forward to leave ASAP. No PRN meds needed, wants to have some sleep pills as she has sleep issues for years. Suggested to give her some Melatonin3 mg HS.

## 2019-06-10 NOTE — PROGRESS NOTE BEHAVIORAL HEALTH - NSBHATTESTSEENBY_PSY_A_CORE
NP without Attending Psychiatrist
attending Psychiatrist without NP/Trainee
NP without Attending Psychiatrist

## 2019-06-10 NOTE — PROGRESS NOTE BEHAVIORAL HEALTH - NSBHADMITMEDEDUDETAILS_A_CORE FT
Psychoeducation provided re: need for Rx adherence re: thyroid Rx, and adherence to aftercare for sx management with teach back verbalized
Psychoeducation provided re: potential benefits/SE of antidepressant therapy and of Trazadone with patient saying ok

## 2019-06-10 NOTE — PROGRESS NOTE BEHAVIORAL HEALTH - NSBHCHARTREVIEWIMAGING_PSY_A_CORE FT
chest x-ray normal study
chest xray normal study

## 2019-06-10 NOTE — PROGRESS NOTE BEHAVIORAL HEALTH - NSBHFUPINTERVALCCFT_PSY_A_CORE
" Ready to leave today"
" I am counting my days here "
"I only overdosed a little, and I know it was stupid.  I would never do that again.  Now I see how stupid it was"
"My family is very supportive.  They came to visit from [place]"
" Ready to leave tomorrow "
" Oh I have to stay over the weekend "

## 2019-06-10 NOTE — PROGRESS NOTE BEHAVIORAL HEALTH - NSBHADMITIPOBSFT_PSY_A_CORE
routine checks once on a psychiatric unit.  no current SI and reports will tell staff if she has SI or HI

## 2019-06-10 NOTE — PROGRESS NOTE BEHAVIORAL HEALTH - PRIMARY DX
Adjustment disorder with mixed disturbance of emotions and conduct

## 2019-06-10 NOTE — PROGRESS NOTE BEHAVIORAL HEALTH - RISK ASSESSMENT
Denies current SI or HI.  Risk factors:  recent SA in front of family, marital conflict, mood episode, insomnia  Protective factors: Responsibility to others, IDs reasons to live, future oriented, supportive social network, engaged in work, high spirituality CAMS assessment ot 6/5 shows low risk for suicide
Denies current SI or HI.  Risk factors:  recent SA in front of family, marital conflict, mood episode, insomnia  Protective factors: Responsibility to others, IDs reasons to live, future oriented, supportive social network, engaged in work, high spirituality

## 2019-06-10 NOTE — PROGRESS NOTE BEHAVIORAL HEALTH - NSBHADMITIPBHPROVFT_PSY_A_CORE
called therapist Mary Ann  ([phone]) who reports pt has never expressed SI in the past and she does not feel patient will be a risk if she is DC on Monday.  She reports she has an appt with patient on Tuesday June 11 at 6pm, and she made an appt with the psychiatrist for the patient for next Wednesday, June 12.
called therapist Mary Ann  ([phone]) who reports pt has never expressed SI in the past and she does not feel patient will be a risk if she is DC on Monday.  She reports she has an appt with patient on Tuesday June 11 at 6pm, and she made an appt with the psychiatrist for the patient for next Wednesday, June 12.
called therapist Mary Ann  ([phone]) who reports pt has never expressed SI in the past and she does not feel patient will be a risk if she is DC on Monday.  She reports she has an appt with patient on Tuesday June 11 at 6pm, and she made an appt with the psychiatrist for the patient for next Wednesday, June 12.
called therapist Mary Ann  ([phone]) who reports pt has never expressed SI in the past and she does not feel patient will be a risk if she is DC on Monday.  She reports she has an appt with patient on Tuesday June 11 at 6pm, and she made an appt with the psychiatrist for the patient for next Wednesday, June 12.
called therapist Mary Ann  ([phone]) who reports pt has never expressed SI in the past and she does not feel patient will be a risk if she is DC on Monday.  She reports she has an appt with patient on Tuesday June 11 at 6pm, and she made an appt with the psychiatrist for the patient for next Wednesday, June 12.

## 2019-06-10 NOTE — PROGRESS NOTE BEHAVIORAL HEALTH - NSBHCHARTREVIEWVS_PSY_A_CORE FT
Vital Signs Last 24 Hrs  T(C): 36.7 (06 Jun 2019 07:45), Max: 36.7 (06 Jun 2019 07:45)  T(F): 98 (06 Jun 2019 07:45), Max: 98 (06 Jun 2019 07:45)  HR: 75 (06 Jun 2019 07:45) (75 - 93)  BP: 127/82 (06 Jun 2019 07:45) (127/82 - 146/79)  BP(mean): --  RR: 16 (06 Jun 2019 07:45) (16 - 16)  SpO2: --
Vital Signs Last 24 Hrs  T(C): 36.7 (06 Jun 2019 07:45), Max: 36.7 (06 Jun 2019 07:45)  T(F): 98 (06 Jun 2019 07:45), Max: 98 (06 Jun 2019 07:45)  HR: 75 (06 Jun 2019 07:45) (75 - 93)  BP: 127/82 (06 Jun 2019 07:45) (127/82 - 146/79)  BP(mean): --  RR: 16 (06 Jun 2019 07:45) (16 - 16)  SpO2: --
Vital Signs Last 24 Hrs  T(C): 36.6 (05 Jun 2019 09:13), Max: 36.6 (05 Jun 2019 09:13)  T(F): 97.8 (05 Jun 2019 09:13), Max: 97.8 (05 Jun 2019 09:13)  HR: 97 (05 Jun 2019 09:13) (97 - 99)  BP: 126/87 (05 Jun 2019 09:13) (116/71 - 126/87)  BP(mean): --  RR: 16 (05 Jun 2019 09:13) (16 - 16)  SpO2: 95% (04 Jun 2019 15:51) (95% - 95%)
Vital Signs Last 24 Hrs  T(C): 36.7 (06 Jun 2019 07:45), Max: 36.7 (06 Jun 2019 07:45)  T(F): 98 (06 Jun 2019 07:45), Max: 98 (06 Jun 2019 07:45)  HR: 75 (06 Jun 2019 07:45) (75 - 93)  BP: 127/82 (06 Jun 2019 07:45) (127/82 - 146/79)  BP(mean): --  RR: 16 (06 Jun 2019 07:45) (16 - 16)  SpO2: --

## 2019-06-10 NOTE — PROGRESS NOTE BEHAVIORAL HEALTH - NSBHADMITDANGERSELF_PSY_A_CORE
suicidal behavior

## 2019-06-10 NOTE — PROGRESS NOTE BEHAVIORAL HEALTH - SUMMARY
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 Patient is a threat to self and needs inpatient level of care for stabilization.  PLAN:  - transfer to inpatient psychiatry; patient is informed (admitted that she needs help and time away to herself)  - given > 2 years daily Ativan use, continue Ativan 2mg PO qhs and Ativan 1mg PO q6hrs prn for potential benzo withdrawal sxs (may be under reporting use). Recommendation to switch Ativan overt to another agent used for insomnia / sleep  - Substances: social drinker; no evidence of alcohol abuse/dependence; low clinical indication for CIWA  - continue Synthroid 75mcg PO qd for hypothyroidism   - regular diet, regular exercise/ambulate independently   - does not need a CO 1;1 on the inpatient psych unit  - continue CO 1:1 while on the medical unit awaiting transfer.  Adjusting to unit.  Declines antidepressant therapy  CAMS assessment of 6/5 shows low risk for suicide
53 yo female with suspected Axis II Cluster B traits (Histrionic), chronic insomnia, with 1.5 yr hx of outpatient weekly individual therapy with great difficulty coping with marital and family conflicts, culminating in a highly impulsive action of intentionally ingesting multiple Ativan pills in front of others with ambivalence about living at the moment. Patient is a threat to self and needs inpatient level of care for stabilization.  PLAN:  - transfer to inpatient psychiatry; patient is informed (admitted that she needs help and time away to herself)  - given > 2 years daily Ativan use, continue Ativan 2mg PO qhs and Ativan 1mg PO q6hrs prn for potential benzo withdrawal sxs (may be under reporting use). Recommendation to switch Ativan overt to another agent used for insomnia / sleep  - Substances: social drinker; no evidence of alcohol abuse/dependence; low clinical indication for CIWA  - continue Synthroid 75mcg PO qd for hypothyroidism   - regular diet, regular exercise/ambulate independently   - does not need a CO 1;1 on the inpatient psych unit  - continue CO 1:1 while on the medical unit awaiting transfer.  Adjusting to unit.  Declines antidepressant therapy  CAMS assessment of 6/5 shows low risk for suicide
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 Patient is a threat to self and needs inpatient level of care for stabilization.  PLAN:  - transfer to inpatient psychiatry; patient is informed (admitted that she needs help and time away to herself)  - given > 2 years daily Ativan use, continue Ativan 2mg PO qhs and Ativan 1mg PO q6hrs prn for potential benzo withdrawal sxs (may be under reporting use). Recommendation to switch Ativan overt to another agent used for insomnia / sleep  - Substances: social drinker; no evidence of alcohol abuse/dependence; low clinical indication for CIWA  - continue Synthroid 75mcg PO qd for hypothyroidism   - regular diet, regular exercise/ambulate independently   - does not need a CO 1;1 on the inpatient psych unit  - continue CO 1:1 while on the medical unit awaiting transfer.  Adjusting to unit.  Declines antidepressant therapy  CAMS assessment of 6/5 shows low risk for suicide
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 Patient is a threat to self and needs inpatient level of care for stabilization.  PLAN:  - transfer to inpatient psychiatry; patient is informed (admitted that she needs help and time away to herself)  - given > 2 years daily Ativan use, continue Ativan 2mg PO qhs and Ativan 1mg PO q6hrs prn for potential benzo withdrawal sxs (may be under reporting use). Recommendation to switch Ativan overt to another agent used for insomnia / sleep  - Substances: social drinker; no evidence of alcohol abuse/dependence; low clinical indication for CIWA  - continue Synthroid 75mcg PO qd for hypothyroidism   - regular diet, regular exercise/ambulate independently   - does not need a CO 1;1 on the inpatient psych unit  - continue CO 1:1 while on the medical unit awaiting transfer.  Adjusting to unit.  Declines antidepressant therapy  CAMS assessment of 6/5 shows low risk for suicide
53 yo female with suspected Axis II Cluster B traits (Histrionic), chronic insomnia, with 1.5 yr hx of outpatient weekly individual therapy with great difficulty coping with marital and family conflicts, culminating in a highly impulsive action of intentionally ingesting multiple Ativan pills in front of others with ambivalence about living at the moment. Patient is a threat to self and needs inpatient level of care for stabilization.  PLAN:  - transfer to inpatient psychiatry; patient is informed (admitted that she needs help and time away to herself)  - given > 2 years daily Ativan use, continue Ativan 2mg PO qhs and Ativan 1mg PO q6hrs prn for potential benzo withdrawal sxs (may be under reporting use). Recommendation to switch Ativan overt to another agent used for insomnia / sleep  - Substances: social drinker; no evidence of alcohol abuse/dependence; low clinical indication for CIWA  - continue Synthroid 75mcg PO qd for hypothyroidism   - regular diet, regular exercise/ambulate independently   - does not need a CO 1;1 on the inpatient psych unit  - continue CO 1:1 while on the medical unit awaiting transfer.  Adjusting to unit.  Declines antidepressant therapy  CAMS assessment of 6/5 shows low risk for suicide
53 yo female with suspected Axis II Cluster B traits (Histrionic), chronic insomnia, with 1.5 yr hx of outpatient weekly individual therapy with great difficulty coping with marital and family conflicts, culminating in a highly impulsive action of intentionally ingesting multiple Ativan pills in front of others with ambivalence about living at the moment. Patient is a threat to self and needs inpatient level of care for stabilization.  PLAN:  - transfer to inpatient psychiatry; patient is informed (admitted that she needs help and time away to herself)  - given > 2 years daily Ativan use, continue Ativan 2mg PO qhs and Ativan 1mg PO q6hrs prn for potential benzo withdrawal sxs (may be under reporting use). Recommendation to switch Ativan overt to another agent used for insomnia / sleep  - Substances: social drinker; no evidence of alcohol abuse/dependence; low clinical indication for CIWA  - continue Synthroid 75mcg PO qd for hypothyroidism   - regular diet, regular exercise/ambulate independently   - does not need a CO 1;1 on the inpatient psych unit  - continue CO 1:1 while on the medical unit awaiting transfer.  Adjusting to unit.  Declines antidepressant therapy  CAMS assessment of 6/5 shows low risk for suicide

## 2019-06-11 DIAGNOSIS — E03.9 HYPOTHYROIDISM, UNSPECIFIED: ICD-10-CM

## 2019-06-11 DIAGNOSIS — F32.9 MAJOR DEPRESSIVE DISORDER, SINGLE EPISODE, UNSPECIFIED: ICD-10-CM

## 2019-06-11 DIAGNOSIS — Z91.5 PERSONAL HISTORY OF SELF-HARM: ICD-10-CM

## 2019-06-11 DIAGNOSIS — T42.4X2A POISONING BY BENZODIAZEPINES, INTENTIONAL SELF-HARM, INITIAL ENCOUNTER: ICD-10-CM

## 2020-02-18 NOTE — PATIENT PROFILE BEHAVIORAL HEALTH - PRO SERVICES AT [facility]
Continue Regimen: Hydrocortisone 2.5% BID PRN\\nClobetasol spray 0.05% BID PRN Other Instructions: Recommended tar product but pt declines at this time Detail Level: Zone Action 2: Continue none

## 2020-12-21 ENCOUNTER — APPOINTMENT (OUTPATIENT)
Dept: GASTROENTEROLOGY | Facility: CLINIC | Age: 55
End: 2020-12-21
Payer: COMMERCIAL

## 2020-12-21 VITALS
HEIGHT: 69 IN | DIASTOLIC BLOOD PRESSURE: 78 MMHG | RESPIRATION RATE: 17 BRPM | WEIGHT: 191 LBS | OXYGEN SATURATION: 99 % | BODY MASS INDEX: 28.29 KG/M2 | TEMPERATURE: 98 F | SYSTOLIC BLOOD PRESSURE: 121 MMHG | HEART RATE: 64 BPM

## 2020-12-21 DIAGNOSIS — E07.9 DISORDER OF THYROID, UNSPECIFIED: ICD-10-CM

## 2020-12-21 DIAGNOSIS — Z12.11 ENCOUNTER FOR SCREENING FOR MALIGNANT NEOPLASM OF COLON: ICD-10-CM

## 2020-12-21 DIAGNOSIS — R12 HEARTBURN: ICD-10-CM

## 2020-12-21 DIAGNOSIS — Z20.828 CONTACT WITH AND (SUSPECTED) EXPOSURE TO OTHER VIRAL COMMUNICABLE DISEASES: ICD-10-CM

## 2020-12-21 DIAGNOSIS — Z78.9 OTHER SPECIFIED HEALTH STATUS: ICD-10-CM

## 2020-12-21 DIAGNOSIS — U07.1 COVID-19: ICD-10-CM

## 2020-12-21 DIAGNOSIS — Z84.1 FAMILY HISTORY OF DISORDERS OF KIDNEY AND URETER: ICD-10-CM

## 2020-12-21 PROBLEM — Z00.00 ENCOUNTER FOR PREVENTIVE HEALTH EXAMINATION: Status: ACTIVE | Noted: 2020-12-21

## 2020-12-21 PROCEDURE — 99203 OFFICE O/P NEW LOW 30 MIN: CPT

## 2020-12-21 PROCEDURE — 99072 ADDL SUPL MATRL&STAF TM PHE: CPT

## 2020-12-21 RX ORDER — SODIUM SULFATE, POTASSIUM SULFATE, MAGNESIUM SULFATE 17.5; 3.13; 1.6 G/ML; G/ML; G/ML
17.5-3.13-1.6 SOLUTION, CONCENTRATE ORAL
Qty: 1 | Refills: 0 | Status: ACTIVE | COMMUNITY
Start: 2020-12-21 | End: 1900-01-01

## 2020-12-21 RX ORDER — LEVOTHYROXINE SODIUM 100 UG/1
100 TABLET ORAL
Qty: 90 | Refills: 0 | Status: ACTIVE | COMMUNITY
Start: 2020-09-25

## 2020-12-21 RX ORDER — NABUMETONE 500 MG/1
500 TABLET, FILM COATED ORAL
Qty: 42 | Refills: 0 | Status: ACTIVE | COMMUNITY
Start: 2020-10-30

## 2020-12-21 RX ORDER — LORAZEPAM 2 MG/1
2 TABLET ORAL
Qty: 30 | Refills: 0 | Status: ACTIVE | COMMUNITY
Start: 2020-12-04

## 2020-12-21 NOTE — CONSULT LETTER
[Dear  ___] : Dear  [unfilled], [Consult Letter:] : I had the pleasure of evaluating your patient, [unfilled]. [Please see my note below.] : Please see my note below. [Consult Closing:] : Thank you very much for allowing me to participate in the care of this patient.  If you have any questions, please do not hesitate to contact me. [Sincerely,] : Sincerely, [FreeTextEntry3] : Karl Lieberman MD, FACG\par

## 2020-12-21 NOTE — HISTORY OF PRESENT ILLNESS
[Heartburn] : stable heartburn [Nausea] : denies nausea [Vomiting] : denies vomiting [Diarrhea] : denies diarrhea [Constipation] : denies constipation [Yellow Skin Or Eyes (Jaundice)] : denies jaundice [Abdominal Pain] : denies abdominal pain [Abdominal Swelling] : denies abdominal swelling [Rectal Pain] : denies rectal pain [Wt Gain ___ Lbs] : no recent weight gain [de-identified] : 55 year old woman referred for a screening colonoscopy. this will be her first colonoscopy. She denies rectal bleeding, melena or hematemesis. she complains of heartburn especially when she eats spicy food. 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 She recovered  from a mild case of Covid.

## 2021-02-25 ENCOUNTER — LABORATORY RESULT (OUTPATIENT)
Age: 56
End: 2021-02-25

## 2021-02-26 ENCOUNTER — APPOINTMENT (OUTPATIENT)
Dept: GASTROENTEROLOGY | Facility: CLINIC | Age: 56
End: 2021-02-26

## 2021-03-03 ENCOUNTER — APPOINTMENT (OUTPATIENT)
Dept: GASTROENTEROLOGY | Facility: AMBULATORY MEDICAL SERVICES | Age: 56
End: 2021-03-03
Payer: COMMERCIAL

## 2021-03-03 PROCEDURE — 43239 EGD BIOPSY SINGLE/MULTIPLE: CPT

## 2021-03-03 PROCEDURE — 45378 DIAGNOSTIC COLONOSCOPY: CPT | Mod: 33

## 2021-03-08 NOTE — PROGRESS NOTE BEHAVIORAL HEALTH - NSBHFUPIPCHARTREV_PSY_A_CORE
Oncology Follow Up Visit: March 8, 2021      Oncologist: Dr Rogelio Hidalgo  ENT: Dr Kaiser  PCP: Jose Manuel Pierce    Diagnosis: Squamous cell carcinoma of the esophagus  Kayleigh Rocha is a 62 yo  female with 80+pack year smoking history that presented in 3/2017 with mass to the left side of the mouth with increasing pain- first noted 6/2016 but thought to be denture pain. 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 Composite resection of tumor of the left buccal mucosa, retromolar trigone, oral commissure and facial skin and lips including left segmental mandibulectomy.Modified radical neck dissection of left levels 1A, 1B, 2, 3 and 4. Left osteocutaneous scapula free flap with microvascular anastomosis  Left neck vessel exploration and prep Local advancement flap for closure of scapula defect Reconstruction of lip, cheek, and oral cavity.  6/1/2017 Began chemoradiation with cisplatin- day 22 delay x 1 week- last XRT-7/19/2017 and day 43 infusion given 7/20/2017 1/2021- diagnosed with Squamous cell carcinoma of the esophagus  2/23/2021- began chemoradiation with carboplatin/ Taxol at [facility]    Interval History: Ms. Rocha began chemoradiation with carboplatin/Taxol on 2/23/2021. Pt went to ER yesterday with chest pain and was checked for PE with CT but no new issues were found and she was released and is feeling well today. She did not feel it was a regurgitation.  She admits she is not eating well with poor appetite and is resigned to getting a G-tube.  She has not had a bowel movement in couple days again and is not using any medication.  She has been using Zofran to help with some nausea.  She occasionally uses the oxycodone for pain and currently has no pain listed.  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  Rest  "of comprehensive and complete ROS is reviewed and is negative.   Past Medical History:   Diagnosis Date     Depressive disorder      Personal history of chemotherapy     Cisplatin (6/1/2017 - 7/20/2017)     S/P radiation therapy     6,600 cGy to oral cavity_bilateral neck completed on 7/19/2017 - [facility]     Squamous cell carcinoma of esophagus (H) 01/11/2021     Squamous cell carcinoma of oral cavity (H) 03/15/2017     Tobacco abuse      Current Outpatient Medications   Medication     amLODIPine (NORVASC) 5 MG tablet     lidocaine-prilocaine (EMLA) 2.5-2.5 % external cream     LORazepam (ATIVAN) 0.5 MG tablet     magic mouthwash (ENTER INGREDIENTS IN COMMENTS) suspension     Melatonin 10 MG TABS tablet     ondansetron (ZOFRAN) 8 MG tablet     oxyCODONE (ROXICODONE) 5 MG tablet     pantoprazole (PROTONIX) 40 MG EC tablet     prochlorperazine (COMPAZINE) 10 MG tablet     No current facility-administered medications for this visit.      No Known Allergies    Physical Exam: Ht 1.651 m (5' 5\")   BMI 18.30 kg/m     GENERAL: Alert and oriented   RESP: No SOB with conversation and no cough noted.   PSYCH: Mentation appears normal, affect normal/bright, judgement and insight intact, normal speech  The rest of a comprehensive physical examination is deferred due to PHE (public health emergency)- phone visit.    Laboratory Results:   No results found for any visits on 03/08/21.- will be taken prior to infusion at [facility]     Assessment and Plan:   Squamous cell carcinoma of the esophagus- Pt began chemoradiation with carboplatin and Taxol at [facility] on 2/23. She continues treating nausea but admits appetite is decreased as well as intake.   She was into [place] ER for chest pain last night but ruled out PE and no new issues noted.     She has been good about meeting goals for treatment.   She has weekly visits set for review of symptoms- she will talk with Dr Hidalgo on 3/18 for review.   Labs/weight prior to " each infusion.   Nausea/ nutritional issues- Pt admits her intake is decreased and is ready for gtube- Care coordinator Aren notified and will help to set this up..   Continues on pantoprozole daily.   Pain from her cancer- Using Oxycodone 5mg tabs as needed- denies current pain.   Hypothyroidism- we will monitor through plan- ordered for this weeks lab review.   Stage Jarad Squamous cell carcinoma of the oral cavity- Pt completed chemoradiation with cisplatin on 7/20/2018. This will be reviewed with esophageal cancer imaging.   History of Tobacco/ alchol use-Pt confirms no further use of tobacco or alcohol since starting plan.   Covid-19 precautions- Reviewed precautions for prevention of transmission and encouraged vaccination after chemoradiation   The total time of this encounter amounted to 25 minutes. This time included 11 min phone call time spent with the patient, prep work, ordering tests and coordinating gtube placement and performing post visit documentation.  Pilar Presley,Cnp       yes

## 2021-03-15 ENCOUNTER — APPOINTMENT (OUTPATIENT)
Dept: GASTROENTEROLOGY | Facility: CLINIC | Age: 56
End: 2021-03-15
Payer: COMMERCIAL

## 2021-03-15 VITALS
TEMPERATURE: 97.5 F | OXYGEN SATURATION: 98 % | HEIGHT: 69 IN | BODY MASS INDEX: 29.18 KG/M2 | RESPIRATION RATE: 17 BRPM | DIASTOLIC BLOOD PRESSURE: 76 MMHG | SYSTOLIC BLOOD PRESSURE: 118 MMHG | WEIGHT: 197 LBS | HEART RATE: 72 BPM

## 2021-03-15 DIAGNOSIS — R10.9 UNSPECIFIED ABDOMINAL PAIN: ICD-10-CM

## 2021-03-15 PROCEDURE — 99072 ADDL SUPL MATRL&STAF TM PHE: CPT

## 2021-03-15 PROCEDURE — 99213 OFFICE O/P EST LOW 20 MIN: CPT

## 2021-03-15 RX ORDER — CLARITHROMYCIN 500 MG/1
500 TABLET, FILM COATED ORAL TWICE DAILY
Qty: 20 | Refills: 0 | Status: ACTIVE | COMMUNITY
Start: 2021-03-15 | End: 1900-01-01

## 2021-03-15 RX ORDER — OMEPRAZOLE 20 MG/1
20 CAPSULE, DELAYED RELEASE ORAL
Qty: 20 | Refills: 0 | Status: ACTIVE | COMMUNITY
Start: 2021-03-15 | End: 1900-01-01

## 2021-03-15 RX ORDER — AMOXICILLIN 500 MG/1
500 TABLET, FILM COATED ORAL TWICE DAILY
Qty: 40 | Refills: 0 | Status: ACTIVE | COMMUNITY
Start: 2021-03-15 | End: 1900-01-01

## 2021-03-15 NOTE — HISTORY OF PRESENT ILLNESS
[de-identified] : 56 year old female follow up from screening colonoscopy and EGD preformed on 3/3/21. EGD revealed Moderate Chronic Inflammation and Positive for H Pylori Infection. Patient states she has intermittent abdominal pain. Denies heartburn, acid reflux, nausea, vomiting or hematemesis. Colonoscopy was normal f/u for screening in 10 years. Bowel movements are daily and regular without difficulty or strain. Denies rectal bleeding, blood in the stool, or melena.

## 2021-03-15 NOTE — ASSESSMENT
[FreeTextEntry1] : Attending Attestation \par \par H pylori Infection \par \par I reviewed the following at length with the patient:\par Helicobacter pylori (H. pylori) is a type of bacteria that causes infection in the stomach. It is the main\par cause of peptic ulcers and it can also cause gastritis and stomach\par About 30 to 40 percent of people in the [place] get an H. pylori infection. Most people get it as a\par child. H. pylori usually does not cause symptoms. But it can break down the inner protective coating in\par some people's stomachs and cause inflammation. This can lead to gastritis or a peptic ulcer.\par Researchers aren't sure how H. pylori spreads. They think that it may spread by unclean food and water,\par or through contact with an infected person's saliva and other body fluids.\par A peptic ulcer causes a dull or burning pain in your stomach, especially when you have an empty\par stomach. It lasts for minutes to hours, and it may come and go for several days or weeks. It may also\par cause other symptoms, such as bloating, nausea, and weight loss. If you have the symptoms of a peptic\par ulcer, your health care provider will check to see whether you have H. pylori. There are blood, breath,\par and stool tests to check for H. pylori. In some cases, you may need an upper often with a biopsy. \par \par Prescribed Clarithromycin 500 mg 1 tab PO 2x per day.\par Prescribed Amoxicillin 500 mg PO 2 tabs 2x per day.\par Prescribed Omeprazole 20 mg PO 2x per day. \par Prescribed Probiotic Align 1 capsule daily. \par \par All medication reviewed side effects and adverse reactions. \par \par Instructed patient to take all medication as prescribed for 10 days upon completion of medication she will call the office to schedule a breath test for 6 weeks. \par \par At that time Urea Breath test will be ordered and scheduled. Patient educated about Urea Breath Test and how the test is performed. \par \par Patient made aware 3 percent chance first round of antibiotics may not eradicate bacteria patient at that time will need a second round of antibiotics. \par \par Patient instructed to HOLD hyperlipidema medication Lipitor while taking the antibiotics to avoid liver dysfunction and rhabdomyolysis. \par Patient verbalized understanding of all information provided. All questions answered and reviewed.

## 2021-03-15 NOTE — HISTORY OF PRESENT ILLNESS
[de-identified] : 56 year old female follow up from screening colonoscopy and EGD preformed on 3/3/21. EGD revealed Moderate Chronic Inflammation and Positive for H Pylori Infection. Patient states she has intermittent abdominal pain. Denies heartburn, acid reflux, nausea, vomiting or hematemesis. Colonoscopy was normal f/u for screening in 10 years. Bowel movements are daily and regular without difficulty or strain. Denies rectal bleeding, blood in the stool, or melena.

## 2021-03-15 NOTE — PHYSICAL EXAM
[General Appearance - Alert] : alert [General Appearance - In No Acute Distress] : in no acute distress [Sclera] : the sclera and conjunctiva were normal [PERRL With Normal Accommodation] : pupils were equal in size, round, and reactive to light [Extraocular Movements] : extraocular movements were intact [Outer Ear] : the ears and nose were normal in appearance [Oropharynx] : the oropharynx was normal [Neck Appearance] : the appearance of the neck was normal [Neck Cervical Mass (___cm)] : no neck mass was observed [Jugular Venous Distention Increased] : there was no jugular-venous distention [Thyroid Diffuse Enlargement] : the thyroid was not enlarged [Thyroid Nodule] : there were no palpable thyroid nodules [] : no respiratory distress [Auscultation Breath Sounds / Voice Sounds] : lungs were clear to auscultation bilaterally [Heart Rate And Rhythm] : heart rate was normal and rhythm regular [Heart Sounds] : normal S1 and S2 [Heart Sounds Gallop] : no gallops [Murmurs] : no murmurs [Heart Sounds Pericardial Friction Rub] : no pericardial rub [Flat] : flat [Obese] : obese [Soft, Nontender] : the abdomen was soft and nontender [Normal] : normal to percussion [Cervical Lymph Nodes Enlarged Posterior Bilaterally] : posterior cervical [Cervical Lymph Nodes Enlarged Anterior Bilaterally] : anterior cervical [Supraclavicular Lymph Nodes Enlarged Bilaterally] : supraclavicular [Axillary Lymph Nodes Enlarged Bilaterally] : axillary [Femoral Lymph Nodes Enlarged Bilaterally] : femoral [Inguinal Lymph Nodes Enlarged Bilaterally] : inguinal [No CVA Tenderness] : no ~M costovertebral angle tenderness [No Spinal Tenderness] : no spinal tenderness [Abnormal Walk] : normal gait [Nail Clubbing] : no clubbing  or cyanosis of the fingernails [Musculoskeletal - Swelling] : no joint swelling seen [Motor Tone] : muscle strength and tone were normal [Deep Tendon Reflexes (DTR)] : deep tendon reflexes were 2+ and symmetric [Sensation] : the sensory exam was normal to light touch and pinprick [No Focal Deficits] : no focal deficits [Oriented To Time, Place, And Person] : oriented to person, place, and time [Impaired Insight] : insight and judgment were intact [Affect] : the affect was normal [Firm] : not firm [Rigid] : not rigid [Rebound] : no rebound [Guarding] : no guarding [Houser's] : a negative Houser's sign

## 2021-03-15 NOTE — REVIEW OF SYSTEMS
[As Noted in HPI] : as noted in HPI [Abdominal Pain] : abdominal pain [Negative] : Heme/Lymph [Vomiting] : no vomiting [Constipation] : no constipation [Diarrhea] : no diarrhea [Heartburn] : no heartburn [Melena] : no melena [FreeTextEntry7] : Intermittent abdominal pain

## 2021-04-12 ENCOUNTER — APPOINTMENT (OUTPATIENT)
Dept: GASTROENTEROLOGY | Facility: CLINIC | Age: 56
End: 2021-04-12
Payer: COMMERCIAL

## 2021-04-12 VITALS
RESPIRATION RATE: 18 BRPM | OXYGEN SATURATION: 99 % | HEART RATE: 63 BPM | TEMPERATURE: 97.7 F | SYSTOLIC BLOOD PRESSURE: 106 MMHG | HEIGHT: 69 IN | DIASTOLIC BLOOD PRESSURE: 67 MMHG | BODY MASS INDEX: 29.18 KG/M2 | WEIGHT: 197 LBS

## 2021-04-12 DIAGNOSIS — R10.9 UNSPECIFIED ABDOMINAL PAIN: ICD-10-CM

## 2021-04-12 DIAGNOSIS — A04.8 OTHER SPECIFIED BACTERIAL INTESTINAL INFECTIONS: ICD-10-CM

## 2021-04-12 PROCEDURE — 99213 OFFICE O/P EST LOW 20 MIN: CPT

## 2021-04-12 PROCEDURE — 99072 ADDL SUPL MATRL&STAF TM PHE: CPT

## 2021-04-12 RX ORDER — BISMUTH SUBSALICYLATE 525 MG/1
TABLET ORAL
Qty: 30 | Refills: 0 | Status: ACTIVE | COMMUNITY
Start: 2021-03-15 | End: 1900-01-01

## 2021-04-12 NOTE — HISTORY OF PRESENT ILLNESS
[de-identified] : 56 year old female follow up from EGD preformed on 3/3/21. EGD revealed Moderate Chronic Inflammation and Positive for H Pylori Infection. Patient was treated with Antibiotics and Omeprazole for 10 days. She completed course and now presents today with complaints of intermittent abdominal discomfort. She admits to not adhering to proper diet and she does not avoid triggers that worsen symptoms. Bowel movements are daily and regular without difficulty or strain. Denies rectal bleeding, blood in the stool, or melena. \par Patient stated she thought this visit was for breath test f/u however she is not passed the 6 week eve for completion of antibiotics and she also consumed food 2 hours ago.

## 2021-04-12 NOTE — ASSESSMENT
[FreeTextEntry1] : Attending Attestation \par \par Abdominal Discomfort \par \par A low acid / reflux diet was discussed in great detail including not smoking, not drinking alcohol, and not\par consuming foods that irritate the esophagus. It is helpful to eat small meals throughout the day instead\par of large meals. You should avoid eating before bedtime or lying down after you eat. It can be helpful to\par raise the head of your bed six inches. Additionally, you should maintain a healthy weight and good\par posture.. The patient was given written material to take home and review. \par \par Patient will adhere to dietary and lifestyle changes to avoid triggers that worsen or exacerbate symptoms. \par \par Famotidine 20 mg PO 2 times per day prescribed. Medication reviewed side effects and adverse reactions. \par \par H Pylori Infection \par \par Patient will have H pylori stool antigen test preformed. Instructions provided to patient how to obtain sample and where to return specimen upon retrieval. \par \par Patient will be called with the results immediately once received. \par \par Patient will f/u in the office in 3 months. \par \par Patient verbalized understanding of all information provided. All questions answered and reviewed. \par \par

## 2021-04-12 NOTE — REVIEW OF SYSTEMS
[As Noted in HPI] : as noted in HPI [Negative] : Heme/Lymph [FreeTextEntry7] : Abdominal Discomfort

## 2021-04-12 NOTE — PHYSICAL EXAM
[General Appearance - Alert] : alert [General Appearance - In No Acute Distress] : in no acute distress [Sclera] : the sclera and conjunctiva were normal [PERRL With Normal Accommodation] : pupils were equal in size, round, and reactive to light [Extraocular Movements] : extraocular movements were intact [Outer Ear] : the ears and nose were normal in appearance [Oropharynx] : the oropharynx was normal [Neck Appearance] : the appearance of the neck was normal [Neck Cervical Mass (___cm)] : no neck mass was observed [Jugular Venous Distention Increased] : there was no jugular-venous distention [Thyroid Diffuse Enlargement] : the thyroid was not enlarged [Thyroid Nodule] : there were no palpable thyroid nodules [] : no respiratory distress [Auscultation Breath Sounds / Voice Sounds] : lungs were clear to auscultation bilaterally [Heart Rate And Rhythm] : heart rate was normal and rhythm regular [Heart Sounds] : normal S1 and S2 [Heart Sounds Gallop] : no gallops [Murmurs] : no murmurs [Heart Sounds Pericardial Friction Rub] : no pericardial rub [Flat] : flat [Soft, Nontender] : the abdomen was soft and nontender [Normal] : normal to percussion [Cervical Lymph Nodes Enlarged Posterior Bilaterally] : posterior cervical [Cervical Lymph Nodes Enlarged Anterior Bilaterally] : anterior cervical [Supraclavicular Lymph Nodes Enlarged Bilaterally] : supraclavicular [Axillary Lymph Nodes Enlarged Bilaterally] : axillary [Femoral Lymph Nodes Enlarged Bilaterally] : femoral [Inguinal Lymph Nodes Enlarged Bilaterally] : inguinal [No CVA Tenderness] : no ~M costovertebral angle tenderness [No Spinal Tenderness] : no spinal tenderness [Abnormal Walk] : normal gait [Nail Clubbing] : no clubbing  or cyanosis of the fingernails [Musculoskeletal - Swelling] : no joint swelling seen [Motor Tone] : muscle strength and tone were normal [Deep Tendon Reflexes (DTR)] : deep tendon reflexes were 2+ and symmetric [Sensation] : the sensory exam was normal to light touch and pinprick [No Focal Deficits] : no focal deficits [Oriented To Time, Place, And Person] : oriented to person, place, and time [Impaired Insight] : insight and judgment were intact [Affect] : the affect was normal [Firm] : not firm [Rigid] : not rigid [Rebound] : no rebound [Guarding] : no guarding [Houser's] : a negative Houser's sign

## 2021-05-15 ENCOUNTER — RX RENEWAL (OUTPATIENT)
Age: 56
End: 2021-05-15

## 2021-05-24 LAB — H PYLORI AG STL QL: DETECTED

## 2021-05-25 RX ORDER — TETRACYCLINE HYDROCHLORIDE 500 MG/1
500 CAPSULE ORAL EVERY 6 HOURS
Qty: 40 | Refills: 0 | Status: ACTIVE | COMMUNITY
Start: 2021-05-25 | End: 1900-01-01

## 2021-05-25 RX ORDER — METRONIDAZOLE 500 MG/1
500 TABLET ORAL 3 TIMES DAILY
Qty: 30 | Refills: 0 | Status: ACTIVE | COMMUNITY
Start: 2021-05-25 | End: 1900-01-01

## 2021-05-25 RX ORDER — BISMUTH SUBSALICYLATE 262 MG/1
262 TABLET, CHEWABLE ORAL 4 TIMES DAILY
Qty: 80 | Refills: 0 | Status: ACTIVE | COMMUNITY
Start: 2021-05-25 | End: 1900-01-01

## 2021-06-01 ENCOUNTER — RX RENEWAL (OUTPATIENT)
Age: 56
End: 2021-06-01

## 2021-06-09 ENCOUNTER — RX RENEWAL (OUTPATIENT)
Age: 56
End: 2021-06-09

## 2021-06-09 RX ORDER — OMEPRAZOLE 20 MG/1
20 CAPSULE, DELAYED RELEASE ORAL TWICE DAILY
Qty: 20 | Refills: 0 | Status: ACTIVE | COMMUNITY
Start: 2021-05-25 | End: 1900-01-01

## 2021-08-11 ENCOUNTER — RX RENEWAL (OUTPATIENT)
Age: 56
End: 2021-08-11

## 2021-08-11 RX ORDER — FAMOTIDINE 20 MG/1
20 TABLET, FILM COATED ORAL TWICE DAILY
Qty: 180 | Refills: 0 | Status: ACTIVE | COMMUNITY
Start: 2021-04-12 | End: 1900-01-01

## 2022-12-10 NOTE — PROGRESS NOTE BEHAVIORAL HEALTH - NSBHADMITIPREASON_PSY_A_CORE
Danger to self; mental illness expected to respond to inpatient care
Danger to self; mental illness expected to respond to inpatient care
79
Danger to self; mental illness expected to respond to inpatient care
